# Patient Record
Sex: MALE | URBAN - METROPOLITAN AREA
[De-identification: names, ages, dates, MRNs, and addresses within clinical notes are randomized per-mention and may not be internally consistent; named-entity substitution may affect disease eponyms.]

---

## 2018-09-06 ENCOUNTER — OFFICE (OUTPATIENT)
Dept: URBAN - METROPOLITAN AREA PATHOLOGY 4 | Facility: PATHOLOGY | Age: 83
End: 2018-09-06

## 2018-09-06 ENCOUNTER — AMBULATORY SURGICAL CENTER (OUTPATIENT)
Dept: URBAN - METROPOLITAN AREA SURGERY 20 | Facility: SURGERY | Age: 83
End: 2018-09-06

## 2018-09-06 DIAGNOSIS — R85.89 OTHER ABNORMAL FINDINGS IN SPECIMENS FROM DIGESTIVE ORGANS A: ICD-10-CM

## 2018-09-06 DIAGNOSIS — K57.30 DIVERTICULOSIS OF LARGE INTESTINE WITHOUT PERFORATION OR ABS: ICD-10-CM

## 2018-09-06 DIAGNOSIS — K64.8 OTHER HEMORRHOIDS: ICD-10-CM

## 2018-09-06 DIAGNOSIS — D12.2 BENIGN NEOPLASM OF ASCENDING COLON: ICD-10-CM

## 2018-09-06 LAB
GI HISTOLOGY: A. SELECT: (no result)
GI HISTOLOGY: PDF REPORT: (no result)

## 2018-09-06 PROCEDURE — 88305 TISSUE EXAM BY PATHOLOGIST: CPT

## 2018-09-06 PROCEDURE — 45385 COLONOSCOPY W/LESION REMOVAL: CPT

## 2019-08-05 ENCOUNTER — OFFICE VISIT (OUTPATIENT)
Dept: INTERNAL MEDICINE | Facility: CLINIC | Age: 84
End: 2019-08-05

## 2019-08-05 VITALS
DIASTOLIC BLOOD PRESSURE: 76 MMHG | WEIGHT: 178.8 LBS | BODY MASS INDEX: 28.06 KG/M2 | HEIGHT: 67 IN | SYSTOLIC BLOOD PRESSURE: 134 MMHG

## 2019-08-05 DIAGNOSIS — E78.5 HYPERLIPIDEMIA, UNSPECIFIED HYPERLIPIDEMIA TYPE: ICD-10-CM

## 2019-08-05 DIAGNOSIS — Z00.00 MEDICARE ANNUAL WELLNESS VISIT, SUBSEQUENT: Primary | ICD-10-CM

## 2019-08-05 DIAGNOSIS — I25.10 ARTERIOSCLEROTIC HEART DISEASE: ICD-10-CM

## 2019-08-05 PROCEDURE — 90732 PPSV23 VACC 2 YRS+ SUBQ/IM: CPT | Performed by: INTERNAL MEDICINE

## 2019-08-05 PROCEDURE — G0439 PPPS, SUBSEQ VISIT: HCPCS | Performed by: INTERNAL MEDICINE

## 2019-08-05 PROCEDURE — G0009 ADMIN PNEUMOCOCCAL VACCINE: HCPCS | Performed by: INTERNAL MEDICINE

## 2019-08-05 RX ORDER — RAMIPRIL 2.5 MG/1
2.5 CAPSULE ORAL DAILY
Refills: 4 | COMMUNITY
Start: 2019-06-16 | End: 2020-04-06

## 2019-08-05 RX ORDER — ASPIRIN 81 MG/1
81 TABLET, CHEWABLE ORAL DAILY
COMMUNITY

## 2019-08-05 RX ORDER — ATORVASTATIN CALCIUM 40 MG/1
40 TABLET, FILM COATED ORAL DAILY
Refills: 4 | COMMUNITY
Start: 2019-06-16

## 2019-08-05 NOTE — PROGRESS NOTES
The ABCs of the Annual Wellness Visit  Subsequent Medicare Wellness Visit    Chief Complaint   Patient presents with   • Medicare Wellness-subsequent     yearly exam       Subjective   History of Present Illness:  Kendell Bolden is a 84 y.o. male who presents for a Subsequent Medicare Wellness Visit.  He denies chest pain, dyspnea or abdominal pain. His BP today was 112/71 and last week it was 109/78. He works 25 hours per week. He saw Dr. Scott in June. He will have an infrequent skipped heart beat; he has had them since age 42.  HEALTH RISK ASSESSMENT    Recent Hospitalizations:  No hospitalization(s) within the last year.    Current Medical Providers:  Patient Care Team:  Giovanni Rossi MD as PCP - General (Internal Medicine)    Smoking Status:  Social History     Tobacco Use   Smoking Status Never Smoker   Smokeless Tobacco Never Used       Alcohol Consumption:  Social History     Substance and Sexual Activity   Alcohol Use Yes   • Frequency: Monthly or less   • Drinks per session: 1 or 2       Depression Screen:   PHQ-2/PHQ-9 Depression Screening 8/5/2019   Little interest or pleasure in doing things 0   Feeling down, depressed, or hopeless 0   Trouble falling or staying asleep, or sleeping too much 0   Feeling tired or having little energy 0   Poor appetite or overeating 0   Feeling bad about yourself - or that you are a failure or have let yourself or your family down 0   Trouble concentrating on things, such as reading the newspaper or watching television 0   Moving or speaking so slowly that other people could have noticed. Or the opposite - being so fidgety or restless that you have been moving around a lot more than usual 0   Thoughts that you would be better off dead, or of hurting yourself in some way 0   Total Score 0   If you checked off any problems, how difficult have these problems made it for you to do your work, take care of things at home, or get along with other people? Not difficult at all        Fall Risk Screen:  STEADI Fall Risk Assessment was completed, and patient is at MODERATE risk for falls. Assessment completed on:8/5/2019    Health Habits and Functional and Cognitive Screening:  Functional & Cognitive Status 8/5/2019   Do you have difficulty preparing food and eating? No   Do you have difficulty bathing yourself, getting dressed or grooming yourself? No   Do you have difficulty using the toilet? No   Do you have difficulty moving around from place to place? No   Do you have trouble with steps or getting out of a bed or a chair? No   Current Diet Well Balanced Diet   Dental Exam Up to date   Eye Exam Up to date   Exercise (times per week) 5 times per week   Current Exercise Activities Include Yoga   Do you need help using the phone?  No   Are you deaf or do you have serious difficulty hearing?  No   Do you need help with transportation? No   Do you need help shopping? No   Do you need help preparing meals?  No   Do you need help with housework?  No   Do you need help with laundry? No   Do you need help taking your medications? No   Do you need help managing money? No   Do you ever drive or ride in a car without wearing a seat belt? No   Have you felt unusual stress, anger or loneliness in the last month? No   Who do you live with? Spouse   If you need help, do you have trouble finding someone available to you? No   Have you been bothered in the last four weeks by sexual problems? No   Do you have difficulty concentrating, remembering or making decisions? No         Does the patient have evidence of cognitive impairment? No    Asprin use counseling:Taking ASA appropriately as indicated    Age-appropriate Screening Schedule:  Refer to the list below for future screening recommendations based on patient's age, sex and/or medical conditions. Orders for these recommended tests are listed in the plan section. The patient has been provided with a written plan.    Health Maintenance   Topic Date Due  "  • ZOSTER VACCINE (1 of 2) 07/02/1987   • TDAP/TD VACCINES (1 - Tdap) 09/23/2008   • PNEUMOCOCCAL VACCINES (65+ LOW/MEDIUM RISK) (2 of 2 - PPSV23) 01/26/2016   • LIPID PANEL  08/05/2019   • INFLUENZA VACCINE  08/01/2019          The following portions of the patient's history were reviewed and updated as appropriate: allergies, current medications, past family history, past medical history, past social history, past surgical history and problem list.    Outpatient Medications Prior to Visit   Medication Sig Dispense Refill   • aspirin 81 MG chewable tablet Chew 81 mg Daily.     • atorvastatin (LIPITOR) 40 MG tablet Take 40 mg by mouth Daily.  4   • ramipril (ALTACE) 2.5 MG capsule Take 2.5 mg by mouth Daily.  4     No facility-administered medications prior to visit.        Patient Active Problem List   Diagnosis   • Medicare annual wellness visit, subsequent   • Hyperlipidemia   • Arteriosclerotic heart disease       Advanced Care Planning:  Patient has an advance directive - a copy has not been provided. Have asked the patient to send this to us to add to record    Review of Systems   Respiratory: Negative for shortness of breath.    Cardiovascular: Negative for chest pain.       Compared to one year ago, the patient feels his physical health is better.  Compared to one year ago, the patient feels his mental health is better.    Reviewed chart for potential of high risk medication in the elderly: not applicable  Reviewed chart for potential of harmful drug interactions in the elderly:not applicable    Objective         Vitals:    08/05/19 1559   BP: 134/76   Weight: 81.1 kg (178 lb 12.8 oz)   Height: 170.2 cm (67\")   PainSc: 0-No pain       Body mass index is 28 kg/m².  Discussed the patient's BMI with him. The BMI is in the acceptable range.    Physical Exam   Constitutional: He appears well-developed and well-nourished.   Neck: Carotid bruit is not present.   Cardiovascular: Normal rate, regular rhythm and " normal heart sounds.   Pulmonary/Chest: Effort normal and breath sounds normal.   Vitals reviewed.            Assessment/Plan   Medicare Risks and Personalized Health Plan  CMS Preventative Services Quick Reference  Immunizations Discussed/Encouraged (specific immunizations; Shingrix )    The above risks/problems have been discussed with the patient.  Pertinent information has been shared with the patient in the After Visit Summary.  Follow up plans and orders are seen below in the Assessment/Plan Section.    Diagnoses and all orders for this visit:    1. Medicare annual wellness visit, subsequent (Primary)    2. Hyperlipidemia, unspecified hyperlipidemia type  -     Comprehensive Metabolic Panel; Future  -     Lipid Panel With / Chol / HDL Ratio; Future    3. Arteriosclerotic heart disease    Other orders  -     Pneumococcal Polysaccharide Vaccine 23-Valent (PPSV23) Greater Than or Equal To 3yo Subcutaneous / IM      Follow Up:  No Follow-up on file.     An After Visit Summary and PPPS were given to the patient.     Pneumovax 23 today.    Discussed Shingrix and Tdap.     He exercise 4-5 times per week. Discussed PSA and he wants to stop checking. Check lipids in the next week. Get copy of csope from last year for the records.

## 2019-08-08 ENCOUNTER — RESULTS ENCOUNTER (OUTPATIENT)
Dept: INTERNAL MEDICINE | Facility: CLINIC | Age: 84
End: 2019-08-08

## 2019-08-08 DIAGNOSIS — E78.5 HYPERLIPIDEMIA, UNSPECIFIED HYPERLIPIDEMIA TYPE: ICD-10-CM

## 2019-08-12 LAB
ALBUMIN SERPL-MCNC: 4.1 G/DL (ref 3.5–5.2)
ALBUMIN/GLOB SERPL: 2.1 G/DL
ALP SERPL-CCNC: 61 U/L (ref 39–117)
ALT SERPL-CCNC: 14 U/L (ref 1–41)
AST SERPL-CCNC: 18 U/L (ref 1–40)
BILIRUB SERPL-MCNC: 0.7 MG/DL (ref 0.2–1.2)
BUN SERPL-MCNC: 24 MG/DL (ref 8–23)
BUN/CREAT SERPL: 21.8 (ref 7–25)
CALCIUM SERPL-MCNC: 9.4 MG/DL (ref 8.6–10.5)
CHLORIDE SERPL-SCNC: 104 MMOL/L (ref 98–107)
CHOLEST SERPL-MCNC: 119 MG/DL (ref 0–200)
CHOLEST/HDLC SERPL: 2.64 {RATIO}
CO2 SERPL-SCNC: 28.5 MMOL/L (ref 22–29)
CREAT SERPL-MCNC: 1.1 MG/DL (ref 0.76–1.27)
GLOBULIN SER CALC-MCNC: 2 GM/DL
GLUCOSE SERPL-MCNC: 104 MG/DL (ref 65–99)
HDLC SERPL-MCNC: 45 MG/DL (ref 40–60)
LDLC SERPL CALC-MCNC: 53 MG/DL (ref 0–100)
POTASSIUM SERPL-SCNC: 5.3 MMOL/L (ref 3.5–5.2)
PROT SERPL-MCNC: 6.1 G/DL (ref 6–8.5)
SODIUM SERPL-SCNC: 142 MMOL/L (ref 136–145)
TRIGL SERPL-MCNC: 105 MG/DL (ref 0–150)
VLDLC SERPL CALC-MCNC: 21 MG/DL

## 2019-11-22 ENCOUNTER — TRANSCRIBE ORDERS (OUTPATIENT)
Dept: ADMINISTRATIVE | Facility: HOSPITAL | Age: 84
End: 2019-11-22

## 2019-11-22 DIAGNOSIS — G56.01 CARPAL TUNNEL SYNDROME OF RIGHT WRIST: Primary | ICD-10-CM

## 2019-12-26 ENCOUNTER — HOSPITAL ENCOUNTER (OUTPATIENT)
Dept: INFUSION THERAPY | Facility: HOSPITAL | Age: 84
Discharge: HOME | End: 2019-12-26

## 2019-12-26 DIAGNOSIS — G56.01 CARPAL TUNNEL SYNDROME OF RIGHT WRIST: ICD-10-CM

## 2020-01-23 ENCOUNTER — HOSPITAL ENCOUNTER (OUTPATIENT)
Dept: INFUSION THERAPY | Facility: HOSPITAL | Age: 85
Discharge: HOME | End: 2020-01-23
Admitting: PSYCHIATRY & NEUROLOGY

## 2020-01-23 DIAGNOSIS — Z00.00 MEDICARE ANNUAL WELLNESS VISIT, SUBSEQUENT: ICD-10-CM

## 2020-01-23 PROCEDURE — 95910 NRV CNDJ TEST 7-8 STUDIES: CPT | Performed by: PSYCHIATRY & NEUROLOGY

## 2020-01-23 PROCEDURE — 95886 MUSC TEST DONE W/N TEST COMP: CPT | Performed by: PSYCHIATRY & NEUROLOGY

## 2020-01-23 PROCEDURE — 95886 MUSC TEST DONE W/N TEST COMP: CPT

## 2020-01-23 PROCEDURE — 95910 NRV CNDJ TEST 7-8 STUDIES: CPT

## 2020-01-23 NOTE — PROCEDURES
EMG and Nerve Conduction Studies    I.      Instrument used: Neuromax 1002  II.     Please see data sheets for tabular summary of NCS and details on methods, temperatures and lab standards.   III.    EMG muscles tested for upper extremity studies include the deltoid, biceps, triceps, pronator teres, extensor digitorum communis, first dorsal interosseous and abductor pollicis brevis.    IV.   EMG muscles tested for lower extremity studies include the vastus lateralis, tibialis anterior, peroneus longus, medial gastrocnemius and extensor digitorum brevis.    V.    Additional muscles tested as needed.  Paraspinal muscles tested as needed.   VI.   Please see data sheets for tabular summary of EMG findings.   VII. The complete report includes the data sheets.      Indication: Right carpal tunnel syndrome  History: 84-year-old male with 2-month history of numbness and tingling in the first 3-1/2 digits of the right hand.      Ht: 170.2 cm  Wt: Not reported  HbA1C: No results found for: HGBA1C  TSH: No results found for: TSH    Technical summary:  Nerve conduction studies were obtained in the right arm with comparisons on the left as indicated.  The hands were rather cold initially and so they were warmed prior to study.  Still temperatures dropped below 32 °C at times and temperature correction was used at those times.  Needle examination was obtained on selected muscles of the right arm.    Results:  1.  Absent right median sensory potential.  Normal left median sensory study.  2.  Prolonged right ulnar sensory latency at 3.8 ms with low amplitude of 10.7 µV.  Normal left ulnar sensory latency with low amplitude of 11.7 µV.  3.  Severely prolonged right median motor latency with temperature correction at 7.0 ms with normal conduction velocity.  The amplitude was very low at 1.1 mV from wrist stimulation.  The amplitude was somewhat higher from  palm stimulation but not greater than 50% higher.  4.  Slow right ulnar motor  conduction velocity in the short segment across the elbow at 47.6 m/s with normal velocity below the elbow.  Normal distal latency and amplitudes.  Slow left ulnar motor conduction velocity in the short segment across the elbow at 46.2 m/s with normal velocity below the elbow.  Normal distal latency and amplitudes.  5.  Needle examination of selected muscles in the right arm showed fibrillations and positive sharp waves in the abductor pollicis brevis.  There was a moderate increased number of large motor units with rapid firing rates and reduced interference patterns.  Remaining muscles tested showed normal insertional activities, motor units and recruitment patterns other than an occasional large motor unit in the first dorsal interosseous.  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  This combination is suspicious for more diffuse peripheral neuropathy.  No additional evidence of a right cervical radiculopathy by this study.  The needle exam changes were consistent with the nerve conduction findings.  Study results were discussed with the patient.    Dmitry Knight M.D.              Dictated utilizing Dragon dictation.

## 2020-04-06 ENCOUNTER — TELEMEDICINE (OUTPATIENT)
Dept: INTERNAL MEDICINE | Facility: CLINIC | Age: 85
End: 2020-04-06

## 2020-04-06 DIAGNOSIS — R50.9 FEVER, UNSPECIFIED FEVER CAUSE: Primary | ICD-10-CM

## 2020-04-06 PROCEDURE — 99213 OFFICE O/P EST LOW 20 MIN: CPT | Performed by: INTERNAL MEDICINE

## 2020-04-06 RX ORDER — OSELTAMIVIR PHOSPHATE 75 MG/1
75 CAPSULE ORAL 2 TIMES DAILY
Qty: 10 CAPSULE | Refills: 0 | Status: SHIPPED | OUTPATIENT
Start: 2020-04-06 | End: 2020-08-10

## 2020-04-06 RX ORDER — RAMIPRIL 5 MG/1
5 CAPSULE ORAL DAILY
COMMUNITY

## 2020-04-06 NOTE — PROGRESS NOTES
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 He is sweating profusely. He has a dry cough that is not new. He denies dyspnea. He stopped seeing patients 2.5 weeks ago. He has been to the bank twice in the last week. He wore gloves but no mask. He has no sick contacts. He denies a sore throat.  No Known Allergies    Current Outpatient Medications on File Prior to Visit   Medication Sig Dispense Refill   • aspirin 81 MG chewable tablet Chew 81 mg Daily.     • atorvastatin (LIPITOR) 40 MG tablet Take 40 mg by mouth Daily.  4   • ramipril (ALTACE) 5 MG capsule Take 5 mg by mouth Daily.     • [DISCONTINUED] ramipril (ALTACE) 2.5 MG capsule Take 2.5 mg by mouth Daily.  4     No current facility-administered medications on file prior to visit.        Past Medical History:   Diagnosis Date   • Arteriosclerotic heart disease    • Bifascicular block    • Hyperlipidemia    • PVC (premature ventricular contraction)    • RBBB    • Silent ischemia        Past Surgical History:   Procedure Laterality Date   • COLONOSCOPY  2018   • CORONARY ARTERY BYPASS GRAFT  1999   • FOOT SURGERY         Family History   Problem Relation Age of Onset   • Hypertension Mother    • Aortic stenosis Father    • Hypertension Sister        Social History     Socioeconomic History   • Marital status:      Spouse name: Not on file   • Number of children: Not on file   • Years of education: Not on file   • Highest education level: Not on file   Tobacco Use   • Smoking status: Never Smoker   • Smokeless tobacco: Never Used   Substance and Sexual Activity   • Alcohol use: Yes     Frequency: Monthly or less     Drinks per session: 1 or 2   •  Drug use: No   • Sexual activity: Defer           The following portions of the patient's history were reviewed and updated as appropriate: problem list, allergies, current medications, past medical history, past family history, past social history and past surgical history.    Review of Systems   Constitutional: Positive for fever.   Respiratory: Positive for cough.        Immunization History   Administered Date(s) Administered   • Fluzone High Dose =>65 Years ([employer] ONLY) 09/08/2018   • Hepatitis A 04/30/2018, 11/02/2018   • Pneumococcal Conjugate 13-Valent (PCV13) 01/26/2015   • Pneumococcal Polysaccharide (PPSV23) 08/05/2019   • Td 09/22/2008   • Zoster, Unspecified 10/14/2007       Objective   There were no vitals filed for this visit.  There is no height or weight on file to calculate BMI.  Physical Exam   Constitutional: He appears well-developed and well-nourished.   Neurological: He is alert.   Psychiatric: He has a normal mood and affect.       Procedures    Assessment/Plan   Kendell was seen today for fever.    Diagnoses and all orders for this visit:    Fever, unspecified fever cause  -     oseltamivir (TAMIFLU) 75 MG capsule; Take 1 capsule by mouth 2 (Two) Times a Day.             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 I am more concerned about COVID 19 but sending him to the hospital at this point would not change his management. I explained to his wife and daughter that Plaquenil and Zmax are not indicated. I do think tamiflu is reasonable option given temp and less than 48 hours since symptoms started and it is tail end of flu season. Discussed it would not help COVID. Recc to ER for dyspnea, weakness, or confusion.  Discussed tylenol, self quarantine, and fluids.    8:11 pm on April 6th. I left message on his home phone checking in and no one answered. I called his daughter who said that he did not have a fever with tylenol. Reviewed again symptoms to go to ER for.   No follow-ups on  file.

## 2020-04-07 ENCOUNTER — TELEPHONE (OUTPATIENT)
Dept: INTERNAL MEDICINE | Facility: CLINIC | Age: 85
End: 2020-04-07

## 2020-04-07 NOTE — TELEPHONE ENCOUNTER
Called patient, he states he is terrific.  As far as he can tell he is back to normal. Thanked us for calling to check on him!

## 2020-08-10 ENCOUNTER — OFFICE VISIT (OUTPATIENT)
Dept: INTERNAL MEDICINE | Facility: CLINIC | Age: 85
End: 2020-08-10

## 2020-08-10 VITALS
HEIGHT: 70 IN | SYSTOLIC BLOOD PRESSURE: 136 MMHG | DIASTOLIC BLOOD PRESSURE: 76 MMHG | WEIGHT: 175.2 LBS | TEMPERATURE: 98.3 F | BODY MASS INDEX: 25.08 KG/M2

## 2020-08-10 DIAGNOSIS — E78.49 OTHER HYPERLIPIDEMIA: ICD-10-CM

## 2020-08-10 DIAGNOSIS — Z00.00 MEDICARE ANNUAL WELLNESS VISIT, SUBSEQUENT: Primary | ICD-10-CM

## 2020-08-10 PROCEDURE — G0439 PPPS, SUBSEQ VISIT: HCPCS | Performed by: INTERNAL MEDICINE

## 2020-08-10 NOTE — PROGRESS NOTES
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  He denies chest pain or dyspnea. His BP has been 114-118/68. He is exercising 6 days per week. He saw Dr. Scott last month and he told him that he was doing well.  HEALTH RISK ASSESSMENT    Recent Hospitalizations:  No hospitalization(s) within the last year.    Current Medical Providers:  Patient Care Team:  Giovanni Rossi MD as PCP - General (Internal Medicine)  Sekou Youngblood MD as PCP - Claims Attributed    Smoking Status:  Social History     Tobacco Use   Smoking Status Never Smoker   Smokeless Tobacco Never Used       Alcohol Consumption:  Social History     Substance and Sexual Activity   Alcohol Use Yes   • Frequency: Monthly or less   • Drinks per session: 1 or 2       Depression Screen:   PHQ-2/PHQ-9 Depression Screening 8/10/2020   Little interest or pleasure in doing things 0   Feeling down, depressed, or hopeless 0   Trouble falling or staying asleep, or sleeping too much -   Feeling tired or having little energy -   Poor appetite or overeating -   Feeling bad about yourself - or that you are a failure or have let yourself or your family down -   Trouble concentrating on things, such as reading the newspaper or watching television -   Moving or speaking so slowly that other people could have noticed. Or the opposite - being so fidgety or restless that you have been moving around a lot more than usual -   Thoughts that you would be better off dead, or of hurting yourself in some way -   Total Score 0   If you checked off any problems, how difficult have these problems made it for you to do your work, take care of things at home, or get along with other people? -       Fall Risk Screen:  STEADI Fall Risk Assessment  was completed, and patient is at LOW risk for falls.Assessment completed on:8/10/2020    Health Habits and Functional and Cognitive Screening:  Functional & Cognitive Status 8/10/2020   Do you have difficulty preparing food and eating? No   Do you have difficulty bathing yourself, getting dressed or grooming yourself? No   Do you have difficulty using the toilet? No   Do you have difficulty moving around from place to place? No   Do you have trouble with steps or getting out of a bed or a chair? No   Current Diet Well Balanced Diet   Dental Exam Up to date   Eye Exam Up to date   Exercise (times per week) 6 times per week   Current Exercise Activities Include Cardiovasular Workout on Exercise Equipment   Do you need help using the phone?  No   Are you deaf or do you have serious difficulty hearing?  No   Do you need help with transportation? No   Do you need help shopping? No   Do you need help preparing meals?  No   Do you need help with housework?  No   Do you need help with laundry? No   Do you need help taking your medications? No   Do you need help managing money? No   Do you ever drive or ride in a car without wearing a seat belt? No   Have you felt unusual stress, anger or loneliness in the last month? No   Who do you live with? Spouse   If you need help, do you have trouble finding someone available to you? No   Have you been bothered in the last four weeks by sexual problems? No   Do you have difficulty concentrating, remembering or making decisions? No         Does the patient have evidence of cognitive impairment? No    Asprin use counseling:Taking ASA appropriately as indicated    Age-appropriate Screening Schedule:  Refer to the list below for future screening recommendations based on patient's age, sex and/or medical conditions. Orders for these recommended tests are listed in the plan section. The patient has been provided with a written plan.    Health Maintenance   Topic Date Due   • TDAP/TD  "VACCINES (1 - Tdap) 07/02/1948   • ZOSTER VACCINE (1 of 2) 07/02/1987   • INFLUENZA VACCINE  08/01/2020   • LIPID PANEL  08/12/2020          The following portions of the patient's history were reviewed and updated as appropriate: allergies, current medications, past family history, past medical history, past social history, past surgical history and problem list.    Outpatient Medications Prior to Visit   Medication Sig Dispense Refill   • aspirin 81 MG chewable tablet Chew 81 mg Daily.     • atorvastatin (LIPITOR) 40 MG tablet Take 40 mg by mouth Daily.  4   • ramipril (ALTACE) 5 MG capsule Take 5 mg by mouth Daily.     • oseltamivir (TAMIFLU) 75 MG capsule Take 1 capsule by mouth 2 (Two) Times a Day. 10 capsule 0     No facility-administered medications prior to visit.        Patient Active Problem List   Diagnosis   • Medicare annual wellness visit, subsequent   • Hyperlipidemia   • Arteriosclerotic heart disease       Advanced Care Planning:  ACP discussion was held with the patient during this visit. Patient has an advance directive (not in EMR), copy requested.    Review of Systems   Respiratory: Negative for shortness of breath.    Cardiovascular: Negative for chest pain.       Compared to one year ago, the patient feels his physical health is the same.  Compared to one year ago, the patient feels his mental health is the same.    Reviewed chart for potential of high risk medication in the elderly: yes  Reviewed chart for potential of harmful drug interactions in the elderly:yes    Objective         Vitals:    08/10/20 1634   BP: 136/76   Temp: 98.3 °F (36.8 °C)   Weight: 79.5 kg (175 lb 3.2 oz)   Height: 177.8 cm (70\")       Body mass index is 25.14 kg/m².  Discussed the patient's BMI with him. The BMI is in the acceptable range.    Physical Exam   Constitutional: He appears well-developed and well-nourished.   Neck: Carotid bruit is not present.   Cardiovascular: Normal rate, regular rhythm and normal " heart sounds.   Pulmonary/Chest: Effort normal and breath sounds normal.   Nursing note and vitals reviewed.            Assessment/Plan   Medicare Risks and Personalized Health Plan  CMS Preventative Services Quick Reference  Advance Directive Discussion  Immunizations Discussed/Encouraged (specific immunizations; adacel Tdap )    The above risks/problems have been discussed with the patient.  Pertinent information has been shared with the patient in the After Visit Summary.  Follow up plans and orders are seen below in the Assessment/Plan Section.    Diagnoses and all orders for this visit:    1. Medicare annual wellness visit, subsequent (Primary)    2. Other hyperlipidemia  -     Comprehensive Metabolic Panel  -     Lipid Panel With / Chol / HDL Ratio      Follow Up:  No follow-ups on file.     An After Visit Summary and PPPS were given to the patient.         He developed a rash after his first Shingrix. Labs in the next week. He is doing well.

## 2020-08-14 ENCOUNTER — TELEPHONE (OUTPATIENT)
Dept: INTERNAL MEDICINE | Facility: CLINIC | Age: 85
End: 2020-08-14

## 2020-08-14 NOTE — TELEPHONE ENCOUNTER
Patient called and needs to reschedule lab appt for 9/24. He is available any Monday and needs 8am or is available 8/27, 9/10, 9/3, 9/17, 10/1,10/15 and 10/29.    Best call back [phone]    Attempted warm transfer unsuccessful.

## 2021-03-09 DIAGNOSIS — Z23 IMMUNIZATION DUE: ICD-10-CM

## 2021-04-02 ENCOUNTER — OFFICE VISIT (OUTPATIENT)
Dept: INTERNAL MEDICINE | Facility: CLINIC | Age: 86
End: 2021-04-02

## 2021-04-02 VITALS — BODY MASS INDEX: 25.62 KG/M2 | WEIGHT: 179 LBS | TEMPERATURE: 97.8 F | HEIGHT: 70 IN

## 2021-04-02 DIAGNOSIS — M79.604 RIGHT LEG PAIN: Primary | ICD-10-CM

## 2021-04-02 DIAGNOSIS — E78.49 OTHER HYPERLIPIDEMIA: ICD-10-CM

## 2021-04-02 PROCEDURE — 99213 OFFICE O/P EST LOW 20 MIN: CPT | Performed by: INTERNAL MEDICINE

## 2021-04-02 NOTE — PROGRESS NOTES
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 He will get a tingling sensation with movement of his right ankle. This started 2-3 weeks ago. The right ankle can hurt sometimes. He does not have back pain. He feels the pain down his right lateral thigh.  No Known Allergies    Current Outpatient Medications on File Prior to Visit   Medication Sig Dispense Refill   • aspirin 81 MG chewable tablet Chew 81 mg Daily.     • atorvastatin (LIPITOR) 40 MG tablet Take 40 mg by mouth Daily.  4   • Multiple Vitamins-Minerals (VITEYES AREDS ADVANCED PO) Take  by mouth 2 (two) times a day.     • ramipril (ALTACE) 5 MG capsule Take 5 mg by mouth Daily.       No current facility-administered medications on file prior to visit.       Past Medical History:   Diagnosis Date   • Arteriosclerotic heart disease    • Bifascicular block    • Hyperlipidemia    • PVC (premature ventricular contraction)    • RBBB    • Silent ischemia        Past Surgical History:   Procedure Laterality Date   • CARPAL TUNNEL RELEASE Right 2020   • COLONOSCOPY  09/06/2018   • CORONARY ARTERY BYPASS GRAFT  1999   • FOOT SURGERY     • HAND SURGERY Right        Family History   Problem Relation Age of Onset   • Hypertension Mother    • Aortic stenosis Father    • Hypertension Sister        Social History     Socioeconomic History   • Marital status:      Spouse name: Not on file   • Number of children: Not on file   • Years of education: Not on file   • Highest education level: Not on file   Tobacco Use   • Smoking status: Never Smoker   • Smokeless tobacco: Never Used   Substance and Sexual Activity   • Alcohol use: Yes   • Drug use: No   • Sexual activity: Defer           The following  "portions of the patient's history were reviewed and updated as appropriate: problem list, allergies, current medications, past medical history, past family history, past social history and past surgical history.    Review of Systems    Immunization History   Administered Date(s) Administered   • COVID-19 (PFIZER) 01/04/2021, 01/25/2021   • Flu Vaccine Intradermal Quad 18-64YR 09/16/2018   • Fluzone High Dose =>65 Years (Vaxcare ONLY) 09/08/2018, 09/29/2019   • Hepatitis A 04/30/2018, 11/02/2018   • Pneumococcal Conjugate 13-Valent (PCV13) 01/26/2015   • Pneumococcal Polysaccharide (PPSV23) 08/05/2019   • Shingrix 09/01/2019   • Td 09/22/2008   • Zoster, Unspecified 10/14/2007       Objective   Vitals:    04/02/21 1549   Temp: 97.8 °F (36.6 °C)   Weight: 81.2 kg (179 lb)   Height: 177.8 cm (70\")     Body mass index is 25.68 kg/m².  Physical Exam  Neurological:      Mental Status: He is alert.      Deep Tendon Reflexes:      Reflex Scores:       Patellar reflexes are 2+ on the right side and 2+ on the left side.       Achilles reflexes are 0 on the right side and 0 on the left side.     Comments: 5/5 strength in his legs    Negative SLR   Psychiatric:         Mood and Affect: Mood normal.         Procedures    Assessment/Plan   Diagnoses and all orders for this visit:    1. Right leg pain (Primary)  -     Ambulatory Referral to Physical Therapy Evaluate and treat    2. Other hyperlipidemia  -     Comprehensive Metabolic Panel; Future  -     Lipid Panel With / Chol / HDL Ratio; Future             Trial of PT for likely pinched nerve. Set up labs for his next OV. Recc Tdap at drug [employer] [facility].    No follow-ups on file.  Answers for HPI/ROS submitted by the patient on 4/2/2021  What is the primary reason for your visit?: Lower Extremity Injury  Incident occurred: more than 1 week ago  Incident location: at home  Injury mechanism: unknown  Pain location: right thigh, right ankle  Pain quality: aching  Pain - numeric: 3/10  Pain " course: intermittent  tingling: Yes

## 2021-04-20 ENCOUNTER — TREATMENT (OUTPATIENT)
Dept: PHYSICAL THERAPY | Facility: CLINIC | Age: 86
End: 2021-04-20

## 2021-04-20 DIAGNOSIS — M79.604 LEG PAIN, LATERAL, RIGHT: Primary | ICD-10-CM

## 2021-04-20 PROCEDURE — 97110 THERAPEUTIC EXERCISES: CPT | Performed by: PHYSICAL THERAPIST

## 2021-04-20 PROCEDURE — 97530 THERAPEUTIC ACTIVITIES: CPT | Performed by: PHYSICAL THERAPIST

## 2021-04-20 PROCEDURE — 97161 PT EVAL LOW COMPLEX 20 MIN: CPT | Performed by: PHYSICAL THERAPIST

## 2021-04-20 NOTE — PROGRESS NOTES
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   : 1935  Diagnosis/ICD-10 Code:  Leg pain, lateral, right [M79.604]  Referring practitioner: Giovanni Rossi MD  Date of Initial Visit: 2021  Today's Date: 2021  Patient seen for 1 sessions           Subjective Evaluation    History of Present Illness  Mechanism of injury: 6-10 months onset of R ankle pain after he has had swelling in B legs.  The pain has been going away after exercise. About 6 weeks ago he was playing golf with new  (remote controlled) After using he felt soreness on outer R leg. The leg pain is just about gone but the ankle still hurts.  The ankle is sore in the morning and after after getting out from driving longer distance (1 hour to [place]) Next month he has longer travel plans. .       Patient Occupation: Oral surgeon Pain  Current pain ratin  At best pain ratin  At worst pain ratin  Quality: tight and dull ache             Objective          Observations     Right Ankle/Foot   Positive for edema.       Active Range of Motion     Lumbar   Flexion: Active lumbar flexion: 75% has to bend knees for tight hamstrings    Extension: Active lumbar extension: 2/3.   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     Right   Negative passive SLR.     Lumbar Flexibility Comments:   Moderate tightness of calf muscles, hamstrings, piriformis         See Exercise, Manual, and Modality Logs for complete treatment.   Patient issued HEP for supine 90/90 HS stretch with ankle ROM that can function as a neural glide exercise and supine piriformis stretch (pigeon on his back)  Suggest he return to yoga but be aware of right leg pain changes as yoga does work into far motion of the spine.  Discussed that symptoms may be referred from the lumbar spine due to degenerative changes and may be caused by lifting, twisting, prolonged sitting.   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 year-old male referred to physical therapy for right lateral leg and ankle pain. He presents with a stable clinical presentation.  He has comorbidities of limited ankle ROM due to past club foot surgery and no known personal factors  that may affect his progress in the plan of care.  His signs and symptoms most closely resemble a L5 lumbar radiculopathy and he does report more pain at the end of session.  His pain was not correlated with any testing today.  Swelling in the right ankle pain be neural or vascular compromise.  He does not want to pursue diagnostic testing at this time.  He is motivated to return to yoga and continue good back habits and exercise.  There is no easy solution for his 1 hour drives to work. He will follow up with physical therapy if  needed.  Prognosis: good  Functional Limitations: uncomfortable because of pain and sitting  Goals  Plan Goals: STGs to be met today:    STG1 Patient will demonstrate an independent HEP and education  Met    STG2 Patient will be independent with good back postures and body mechanics to avoid strain to spine with ADLs  Met    LTGs to be achieved by 21:    LTG 1 Patient will manage his condition independently or return to therapy        Plan  Planned therapy interventions: abdominal trunk stabilization, neuromuscular re-education, manual therapy, home exercise program and therapeutic activities  Duration in visits: 20  Duration in weeks: 12  Treatment plan discussed with: patient        Timed:  Manual Therapy:    0     mins  53648;  Therapeutic Exercise:    15     mins  62962;     Neuromuscular Ihsan:    0    mins  13722;    Therapeutic Activity:     10     mins  54493;     Gait Trainin     mins  48833;     Ultrasound:     0     mins  27166;    Electrical Stimulation:    0     mins  37551 ( );  Iontophoresis    0     mins 91379  Dry Needling   0     mins      Untimed:  Electrical Stimulation:    0     mins  36566 ( );  Mechanical Traction:    0     mins  93154;     Timed Treatment:   25   mins   Total Treatment:     50   mins    PT SIGNATURE: Anu Anderson, PT   DATE TREATMENT INITIATED: 2021    Medicare Initial Certification  Certification Period: 2021  I certify that the therapy services are furnished while this patient is under my care.  The services outlined above are required by this patient, and will be reviewed every 90 days.     PHYSICIAN: Giovanni Rossi MD      DATE:     Please sign and return via fax to [phone] Thank you, [facility].

## 2021-09-09 ENCOUNTER — DOCUMENTATION (OUTPATIENT)
Dept: PHYSICAL THERAPY | Facility: CLINIC | Age: 86
End: 2021-09-09

## 2021-09-09 NOTE — PROGRESS NOTES
Closure of Physical Therapy Encounter  Patient did not return after evaluation             Anu Anderson, PT  Physical Therapist

## 2022-02-08 ENCOUNTER — OFFICE VISIT (OUTPATIENT)
Dept: INTERNAL MEDICINE | Facility: CLINIC | Age: 87
End: 2022-02-08

## 2022-02-08 VITALS
BODY MASS INDEX: 25.77 KG/M2 | TEMPERATURE: 97.8 F | HEIGHT: 70 IN | DIASTOLIC BLOOD PRESSURE: 76 MMHG | WEIGHT: 180 LBS | SYSTOLIC BLOOD PRESSURE: 132 MMHG

## 2022-02-08 DIAGNOSIS — Z00.00 MEDICARE ANNUAL WELLNESS VISIT, SUBSEQUENT: Primary | ICD-10-CM

## 2022-02-08 DIAGNOSIS — I25.10 ARTERIOSCLEROTIC HEART DISEASE: ICD-10-CM

## 2022-02-08 DIAGNOSIS — E78.49 OTHER HYPERLIPIDEMIA: ICD-10-CM

## 2022-02-08 PROCEDURE — G0439 PPPS, SUBSEQ VISIT: HCPCS | Performed by: INTERNAL MEDICINE

## 2022-02-08 PROCEDURE — 1159F MED LIST DOCD IN RCRD: CPT | Performed by: INTERNAL MEDICINE

## 2022-02-08 NOTE — PROGRESS NOTES
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     • atorvastatin (LIPITOR) 40 MG tablet Take 40 mg by mouth Daily.  4   • Carboxymethylcellulose Sodium (EYE DROPS OP) Apply  to eye(s) as directed by provider.     • Multiple Vitamins-Minerals (VITEYES AREDS ADVANCED PO) Take  by mouth 2 (two) times a day.     • ramipril (ALTACE) 5 MG capsule Take 5 mg by mouth Daily.       No current facility-administered medications on file prior to visit.       Past Medical History:   Diagnosis Date   • Bifascicular block    • PVC (premature ventricular contraction)    • RBBB        Past Surgical History:   Procedure Laterality Date   • CARPAL TUNNEL RELEASE Right 2020   • COLONOSCOPY  09/06/2018   • CORONARY ARTERY BYPASS GRAFT  1999   • FOOT SURGERY     • HAND SURGERY Right        Family History   Problem Relation Age of Onset   • Hypertension Mother    • Aortic stenosis Father    • Hypertension Sister        Social History     Socioeconomic History   • Marital status:    Tobacco Use   • Smoking status: Never Smoker   • Smokeless tobacco: Never Used   Substance and Sexual Activity   • Alcohol use: Yes   • Drug use: No   • Sexual activity: Defer           The following portions of the patient's history were reviewed and updated as appropriate: {history reviewed:72842::\"problem list\",\"allergies\",\"current medications\",\"past medical history\",\"past family history\",\"past social history\",\"past surgical history\"}.    Review of Systems    Immunization History   Administered Date(s) Administered   • COVID-19 (PFIZER) PURPLE " "CAP 01/04/2021, 01/25/2021   • Flu Vaccine Intradermal Quad 18-64YR 09/16/2018   • Fluzone High Dose =>65 Years (Vaxcare ONLY) 09/08/2018, 09/29/2019   • Hepatitis A 04/30/2018, 11/02/2018   • Pneumococcal Conjugate 13-Valent (PCV13) 01/26/2015   • Pneumococcal Polysaccharide (PPSV23) 08/05/2019   • Shingrix 09/01/2019   • Td 09/22/2008   • Zoster, Unspecified 10/14/2007       Objective   Vitals:    02/08/22 1625   Temp: 97.8 °F (36.6 °C)   Weight: 81.6 kg (180 lb)   Height: 177.8 cm (70\")     Body mass index is 25.83 kg/m².  Physical Exam    Procedures    Assessment/Plan   {Assess/PlanSmartLinks:36726}             No follow-ups on file.  "

## 2022-02-08 NOTE — PROGRESS NOTES
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 male who presents for a Subsequent Medicare Wellness Visit.  He denies chest pain or dyspnea. His BP at home has been 106-110/65-68.  The following portions of the patient's history were reviewed and   updated as appropriate: allergies, current medications, past family history, past medical history, past social history, past surgical history and problem list.    Compared to one year ago, the patient feels his physical   health is the same.    Compared to one year ago, the patient feels his mental   health is the same.    Recent Hospitalizations:  He was not admitted to the hospital during the last year.       Current Medical Providers:  Patient Care Team:  Giovanni Rossi MD as PCP - General (Internal Medicine)    Outpatient Medications Prior to Visit   Medication Sig Dispense Refill   • aspirin 81 MG chewable tablet Chew 81 mg Daily.     • atorvastatin (LIPITOR) 40 MG tablet Take 40 mg by mouth Daily.  4   • Carboxymethylcellulose Sodium (EYE DROPS OP) Apply  to eye(s) as directed by provider.     • Multiple Vitamins-Minerals (VITEYES AREDS ADVANCED PO) Take  by mouth 2 (two) times a day.     • ramipril (ALTACE) 5 MG capsule Take 5 mg by mouth Daily.       No facility-administered medications prior to visit.       No opioid medication identified on active medication list. I have reviewed chart for other potential  high risk medication/s and harmful drug interactions in the elderly.          Aspirin is on active medication list. Aspirin use is indicated based on review of current medical condition/s. Pros and cons of this therapy have been discussed today. Benefits of this medication outweigh potential harm.  Patient has been encouraged to continue taking this medication.   ".      Patient Active Problem List   Diagnosis   • Other hyperlipidemia   • Arteriosclerotic heart disease     Advance Care Planning  Advance Directive is on file.  ACP discussion was held with the patient during this visit. Patient has an advance directive in EMR which is still valid.           Objective    Vitals:    02/08/22 1625   BP: 132/76   Temp: 97.8 °F (36.6 °C)   Weight: 81.6 kg (180 lb)   Height: 177.8 cm (70\")     BMI Readings from Last 1 Encounters:   02/08/22 25.83 kg/m²   BMI is above normal parameters. Recommendations include: exercise counseling    Does the patient have evidence of cognitive impairment? No    Physical Exam  Vitals reviewed.   Constitutional:       Appearance: He is well-developed.   HENT:      Head: Normocephalic and atraumatic.   Neck:      Vascular: No carotid bruit.   Cardiovascular:      Rate and Rhythm: Normal rate and regular rhythm.      Heart sounds: Normal heart sounds, S1 normal and S2 normal.   Pulmonary:      Effort: Pulmonary effort is normal.      Breath sounds: Normal breath sounds.   Skin:     General: Skin is warm.   Neurological:      Mental Status: He is alert.   Psychiatric:         Behavior: Behavior normal.                 HEALTH RISK ASSESSMENT    Smoking Status:  Social History     Tobacco Use   Smoking Status Never Smoker   Smokeless Tobacco Never Used     Alcohol Consumption:  Social History     Substance and Sexual Activity   Alcohol Use Yes     Fall Risk Screen:    STEADI Fall Risk Assessment was completed, and patient is at LOW risk for falls.Assessment completed on:2/8/2022    Depression Screening:  PHQ-2/PHQ-9 Depression Screening 2/8/2022   Little interest or pleasure in doing things 0   Feeling down, depressed, or hopeless 0   Trouble falling or staying asleep, or sleeping too much -   Feeling tired or having little energy -   Poor appetite or overeating -   Feeling bad about yourself - or that you are a failure or have let yourself or your family " down -   Trouble concentrating on things, such as reading the newspaper or watching television -   Moving or speaking so slowly that other people could have noticed. Or the opposite - being so fidgety or restless that you have been moving around a lot more than usual -   Thoughts that you would be better off dead, or of hurting yourself in some way -   Total Score 0   If you checked off any problems, how difficult have these problems made it for you to do your work, take care of things at home, or get along with other people? -       Health Habits and Functional and Cognitive Screening:  Functional & Cognitive Status 2/8/2022   Do you have difficulty preparing food and eating? No   Do you have difficulty bathing yourself, getting dressed or grooming yourself? No   Do you have difficulty using the toilet? No   Do you have difficulty moving around from place to place? No   Do you have trouble with steps or getting out of a bed or a chair? No   Current Diet Well Balanced Diet   Dental Exam Up to date   Eye Exam Up to date   Exercise (times per week) 3 times per week   Current Exercises Include Stair Step Machine;Cardiovascular Workout   Current Exercise Activities Include -   Do you need help using the phone?  No   Are you deaf or do you have serious difficulty hearing?  No   Do you need help with transportation? No   Do you need help shopping? No   Do you need help preparing meals?  No   Do you need help with housework?  No   Do you need help with laundry? No   Do you need help taking your medications? No   Do you need help managing money? No   Do you ever drive or ride in a car without wearing a seat belt? No   Have you felt unusual stress, anger or loneliness in the last month? No   Who do you live with? Spouse   If you need help, do you have trouble finding someone available to you? No   Have you been bothered in the last four weeks by sexual problems? No   Do you have difficulty concentrating, remembering or  making decisions? No       Age-appropriate Screening Schedule:  Refer to the list below for future screening recommendations based on patient's age, sex and/or medical conditions. Orders for these recommended tests are listed in the plan section. The patient has been provided with a written plan.    Health Maintenance   Topic Date Due   • LIPID PANEL  08/16/2022   • TDAP/TD VACCINES (3 - Td or Tdap) 04/12/2031   • INFLUENZA VACCINE  Completed              Assessment/Plan   CMS Preventative Services Quick Reference  Risk Factors Identified During Encounter  Cardiovascular Disease  The above risks/problems have been discussed with the patient.  Follow up actions/plans if indicated are seen below in the Assessment/Plan Section.  Pertinent information has been shared with the patient in the After Visit Summary.    Diagnoses and all orders for this visit:    1. Medicare annual wellness visit, subsequent (Primary)    2. Arteriosclerotic heart disease    3. Other hyperlipidemia  Comments:  LDL was less than 70 last August with Dr. Scott        Follow Up:   No follow-ups on file.     An After Visit Summary and PPPS were made available to the patient.                 He is doing great at age 86.

## 2022-08-14 ENCOUNTER — DOCUMENTATION (OUTPATIENT)
Dept: INTERNAL MEDICINE | Facility: CLINIC | Age: 87
End: 2022-08-14

## 2023-06-13 ENCOUNTER — OFFICE VISIT (OUTPATIENT)
Dept: INTERNAL MEDICINE | Facility: CLINIC | Age: 88
End: 2023-06-13
Payer: MEDICARE

## 2023-06-13 VITALS
SYSTOLIC BLOOD PRESSURE: 142 MMHG | TEMPERATURE: 97.3 F | WEIGHT: 169.6 LBS | DIASTOLIC BLOOD PRESSURE: 76 MMHG | HEIGHT: 70 IN | BODY MASS INDEX: 24.28 KG/M2

## 2023-06-13 DIAGNOSIS — Z00.00 ENCOUNTER FOR SUBSEQUENT ANNUAL WELLNESS VISIT (AWV) IN MEDICARE PATIENT: Primary | ICD-10-CM

## 2023-06-13 DIAGNOSIS — E78.49 OTHER HYPERLIPIDEMIA: ICD-10-CM

## 2023-06-13 PROCEDURE — 1170F FXNL STATUS ASSESSED: CPT | Performed by: INTERNAL MEDICINE

## 2023-06-13 PROCEDURE — 1159F MED LIST DOCD IN RCRD: CPT | Performed by: INTERNAL MEDICINE

## 2023-06-13 PROCEDURE — 1160F RVW MEDS BY RX/DR IN RCRD: CPT | Performed by: INTERNAL MEDICINE

## 2023-06-13 PROCEDURE — G0439 PPPS, SUBSEQ VISIT: HCPCS | Performed by: INTERNAL MEDICINE

## 2023-06-13 NOTE — PROGRESS NOTES
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  He does the ellipitcal, hot yoga and walking. He exercises 5-6 days per week. He denies chest pain or dyspnea. He checked his BP yesterday and it was 112/81.  The following portions of the patient's history were reviewed and   updated as appropriate: allergies, current medications, past family history, past medical history, past social history, past surgical history, and problem list.    Compared to one year ago, the patient feels his physical   health is the same.    Compared to one year ago, the patient feels his mental   health is the same.    Recent Hospitalizations:  He was not admitted to the hospital during the last year.       Current Medical Providers:  Patient Care Team:  Giovanni Rossi MD as PCP - General (Internal Medicine)    Outpatient Medications Prior to Visit   Medication Sig Dispense Refill    aspirin 81 MG chewable tablet Chew 1 tablet Daily.      atorvastatin (LIPITOR) 40 MG tablet Take 1 tablet by mouth Daily.  4    bimatoprost (LUMIGAN) 0.01 % ophthalmic drops 1 drop Every Night.      Carboxymethylcellulose Sodium (EYE DROPS OP) Apply  to eye(s) as directed by provider.      Multiple Vitamins-Minerals (VITEYES AREDS ADVANCED PO) Take  by mouth 2 (two) times a day.      ramipril (ALTACE) 5 MG capsule Take 1 capsule by mouth Daily.      TURMERIC PO Take  by mouth.       No facility-administered medications prior to visit.       No opioid medication identified on active medication list. I have reviewed chart for other potential  high risk medication/s and harmful drug interactions in the elderly.        Aspirin is on active medication list. Aspirin use is indicated based on review of current medical condition/s. Pros and cons of this therapy have been discussed today. Benefits of this medication outweigh  "potential harm.  Patient has been encouraged to continue taking this medication.  .      Patient Active Problem List   Diagnosis    Other hyperlipidemia    Arteriosclerotic heart disease     Advance Care Planning   Advance Care Planning     Advance Directive is on file.  ACP discussion was held with the patient during this visit. Patient has an advance directive in EMR which is still valid.      Objective    Vitals:    23 1623   BP: 142/76   Temp: 97.3 °F (36.3 °C)   Weight: 76.9 kg (169 lb 9.6 oz)   Height: 177.8 cm (70\")     Estimated body mass index is 24.34 kg/m² as calculated from the following:    Height as of this encounter: 177.8 cm (70\").    Weight as of this encounter: 76.9 kg (169 lb 9.6 oz).    BMI is within normal parameters. No other follow-up for BMI required.      Does the patient have evidence of cognitive impairment? No          HEALTH RISK ASSESSMENT    Smoking Status:  Social History     Tobacco Use   Smoking Status Never   Smokeless Tobacco Never     Alcohol Consumption:  Social History     Substance and Sexual Activity   Alcohol Use Yes     Fall Risk Screen:    STEADI Fall Risk Assessment was completed, and patient is at LOW risk for falls.Assessment completed on:2023    Depression Screenin/13/2023     4:29 PM   PHQ-2/PHQ-9 Depression Screening   Little Interest or Pleasure in Doing Things 0-->not at all   Feeling Down, Depressed or Hopeless 0-->not at all   PHQ-9: Brief Depression Severity Measure Score 0       Health Habits and Functional and Cognitive Screenin/13/2023     4:29 PM   Functional & Cognitive Status   Do you have difficulty preparing food and eating? No   Do you have difficulty bathing yourself, getting dressed or grooming yourself? No   Do you have difficulty using the toilet? No   Do you have difficulty moving around from place to place? No   Do you have trouble with steps or getting out of a bed or a chair? No   Current Diet Well Balanced Diet "   Dental Exam Up to date   Eye Exam Up to date   Exercise (times per week) 5 times per week   Current Exercises Include Walking;Treadmill        Exercise Comment yoga   Do you need help using the phone?  No   Are you deaf or do you have serious difficulty hearing?  No   Do you need help with transportation? No   Do you need help shopping? No   Do you need help preparing meals?  No   Do you need help with housework?  No   Do you need help with laundry? No   Do you need help taking your medications? No   Do you need help managing money? No   Do you ever drive or ride in a car without wearing a seat belt? No   Have you felt unusual stress, anger or loneliness in the last month? No   Who do you live with? Spouse   If you need help, do you have trouble finding someone available to you? No   Have you been bothered in the last four weeks by sexual problems? No   Do you have difficulty concentrating, remembering or making decisions? No       Age-appropriate Screening Schedule:  Refer to the list below for future screening recommendations based on patient's age, sex and/or medical conditions. Orders for these recommended tests are listed in the plan section. The patient has been provided with a written plan.    Health Maintenance   Topic Date Due    COVID-19 Vaccine (5 - Pfizer series) 03/16/2023    LIPID PANEL  07/09/2023    INFLUENZA VACCINE  10/01/2023    ANNUAL WELLNESS VISIT  06/13/2024    TDAP/TD VACCINES (3 - Td or Tdap) 04/12/2031    Pneumococcal Vaccine 65+  Completed                  CMS Preventative Services Quick Reference  Risk Factors Identified During Encounter  None Identified  The above risks/problems have been discussed with the patient.  Pertinent information has been shared with the patient in the After Visit Summary.  An After Visit Summary and PPPS were made available to the patient.    Follow Up:   Next Medicare Wellness visit to be scheduled in 1 year.       Additional E&M Note during same encounter  "follows:  Patient has multiple medical problems which are significant and separately identifiable that require additional work above and beyond the Medicare Wellness Visit.      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  He does the ellipitcal, hot yoga and walking. He exercises 5-6 days per week. He denies chest pain or dyspnea. He checked his BP yesterday and it was 112/81.       Objective   Vital Signs:  /76   Temp 97.3 °F (36.3 °C)   Ht 177.8 cm (70\")   Wt 76.9 kg (169 lb 9.6 oz)   BMI 24.34 kg/m²     Physical Exam  Vitals reviewed.   Constitutional:       Appearance: He is well-developed.   HENT:      Head: Normocephalic and atraumatic.      Right Ear: Tympanic membrane and ear canal normal.      Left Ear: Tympanic membrane and ear canal normal.   Neck:      Vascular: No carotid bruit.   Cardiovascular:      Rate and Rhythm: Normal rate and regular rhythm.      Heart sounds: Normal heart sounds, S1 normal and S2 normal.   Pulmonary:      Effort: Pulmonary effort is normal.      Breath sounds: Normal breath sounds.   Abdominal:      Palpations: There is no hepatomegaly or splenomegaly.      Tenderness: There is no abdominal tenderness.   Skin:     General: Skin is warm.   Neurological:      Mental Status: He is alert.   Psychiatric:         Behavior: Behavior normal.                       Assessment and Plan   Diagnoses and all orders for this visit:    1. Encounter for subsequent annual wellness visit (AWV) in Medicare patient (Primary)    2. Other hyperlipidemia  -     Comprehensive Metabolic Panel  -     Lipid Panel With / Chol / HDL Ratio             Follow Up   Return in about 1 year (around 6/13/2024) for Medicare Wellness.  Patient was given instructions and counseling regarding his condition or for health maintenance advice. Please see specific information pulled into the AVS if " appropriate.       He will get labs this week. His Bp is lower at home. He will check daily and get numbers to Dr. Scott on Monday.

## 2023-06-14 LAB
ALBUMIN SERPL-MCNC: 4 G/DL (ref 3.5–5.2)
ALBUMIN/GLOB SERPL: 1.9 G/DL
ALP SERPL-CCNC: 64 U/L (ref 39–117)
ALT SERPL-CCNC: 17 U/L (ref 1–41)
AST SERPL-CCNC: 16 U/L (ref 1–40)
BILIRUB SERPL-MCNC: 0.5 MG/DL (ref 0–1.2)
BUN SERPL-MCNC: 21 MG/DL (ref 8–23)
BUN/CREAT SERPL: 18.8 (ref 7–25)
CALCIUM SERPL-MCNC: 10 MG/DL (ref 8.6–10.5)
CHLORIDE SERPL-SCNC: 104 MMOL/L (ref 98–107)
CHOLEST SERPL-MCNC: 114 MG/DL (ref 0–200)
CHOLEST/HDLC SERPL: 2.92 {RATIO}
CO2 SERPL-SCNC: 28.9 MMOL/L (ref 22–29)
CREAT SERPL-MCNC: 1.12 MG/DL (ref 0.76–1.27)
EGFRCR SERPLBLD CKD-EPI 2021: 64.4 ML/MIN/1.73
GLOBULIN SER CALC-MCNC: 2.1 GM/DL
GLUCOSE SERPL-MCNC: 103 MG/DL (ref 65–99)
HDLC SERPL-MCNC: 39 MG/DL (ref 40–60)
LDLC SERPL CALC-MCNC: 59 MG/DL (ref 0–100)
POTASSIUM SERPL-SCNC: 5.1 MMOL/L (ref 3.5–5.2)
PROT SERPL-MCNC: 6.1 G/DL (ref 6–8.5)
SODIUM SERPL-SCNC: 140 MMOL/L (ref 136–145)
TRIGL SERPL-MCNC: 78 MG/DL (ref 0–150)
VLDLC SERPL CALC-MCNC: 16 MG/DL (ref 5–40)

## 2024-06-18 ENCOUNTER — OFFICE VISIT (OUTPATIENT)
Dept: INTERNAL MEDICINE | Facility: CLINIC | Age: 89
End: 2024-06-18
Payer: MEDICARE

## 2024-06-18 VITALS
BODY MASS INDEX: 24.45 KG/M2 | SYSTOLIC BLOOD PRESSURE: 160 MMHG | HEIGHT: 70 IN | DIASTOLIC BLOOD PRESSURE: 80 MMHG | WEIGHT: 170.8 LBS

## 2024-06-18 DIAGNOSIS — E78.00 HIGH CHOLESTEROL: Chronic | ICD-10-CM

## 2024-06-18 DIAGNOSIS — I25.10 ARTERIOSCLEROTIC HEART DISEASE: ICD-10-CM

## 2024-06-18 DIAGNOSIS — Z00.00 ENCOUNTER FOR SUBSEQUENT ANNUAL WELLNESS VISIT (AWV) IN MEDICARE PATIENT: Primary | ICD-10-CM

## 2024-06-18 DIAGNOSIS — R03.0 ELEVATED BLOOD PRESSURE READING: ICD-10-CM

## 2024-06-18 PROCEDURE — 1159F MED LIST DOCD IN RCRD: CPT | Performed by: INTERNAL MEDICINE

## 2024-06-18 PROCEDURE — 1160F RVW MEDS BY RX/DR IN RCRD: CPT | Performed by: INTERNAL MEDICINE

## 2024-06-18 PROCEDURE — 1126F AMNT PAIN NOTED NONE PRSNT: CPT | Performed by: INTERNAL MEDICINE

## 2024-06-18 PROCEDURE — G0439 PPPS, SUBSEQ VISIT: HCPCS | Performed by: INTERNAL MEDICINE

## 2024-06-18 PROCEDURE — 1170F FXNL STATUS ASSESSED: CPT | Performed by: INTERNAL MEDICINE

## 2024-06-18 NOTE — PROGRESS NOTES
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    The following portions of the patient's history were reviewed and   updated as appropriate: allergies, current medications, past family history, past medical history, past social history, past surgical history, and problem list.    Compared to one year ago, the patient feels his physical   health is the same.    Compared to one year ago, the patient feels his mental   health is the same.    Recent Hospitalizations:  He was not admitted to the hospital during the last year.       Current Medical Providers:  Patient Care Team:  Giovanni Rossi MD as PCP - General (Internal Medicine)  Mat Scott MD as Consulting Physician (Cardiology)  Mt Whittaker MD as Consulting Physician (Dermatology)  Florencio Nolen MD as Consulting Physician (Otolaryngology)    Outpatient Medications Prior to Visit   Medication Sig Dispense Refill    aspirin 81 MG chewable tablet Chew 1 tablet Daily.      atorvastatin (LIPITOR) 40 MG tablet Take 1 tablet by mouth Daily.  4    bimatoprost (LUMIGAN) 0.01 % ophthalmic drops 1 drop Every Night.      Carboxymethylcellulose Sodium (EYE DROPS OP) Apply  to eye(s) as directed by provider.      Multiple Vitamins-Minerals (VITEYES AREDS ADVANCED PO) Take  by mouth 2 (two) times a day.      ramipril (ALTACE) 5 MG capsule Take 1 capsule by mouth Daily.      TURMERIC PO Take  by mouth.       No facility-administered medications prior to visit.       No opioid medication identified on active medication list. I have reviewed chart for other potential  high risk medication/s and harmful drug interactions in the elderly.        Aspirin is on active medication list. Aspirin use is indicated based on review of current medical condition/s. Pros and cons of this therapy have been discussed today. Benefits of this  "medication outweigh potential harm.  Patient has been encouraged to continue taking this medication.  .      Patient Active Problem List   Diagnosis    High cholesterol    Arteriosclerotic heart disease     Advance Care Planning   Advance Care Planning     Advance Directive is on file.  ACP discussion was held with the patient during this visit. Patient has an advance directive in EMR which is still valid.      Objective    Vitals:    24 1628   BP: 160/80   Weight: 77.5 kg (170 lb 12.8 oz)   Height: 177.8 cm (70\")     Estimated body mass index is 24.51 kg/m² as calculated from the following:    Height as of this encounter: 177.8 cm (70\").    Weight as of this encounter: 77.5 kg (170 lb 12.8 oz).    BMI is within normal parameters. No other follow-up for BMI required.      Does the patient have evidence of cognitive impairment? No          HEALTH RISK ASSESSMENT    Smoking Status:  Social History     Tobacco Use   Smoking Status Never   Smokeless Tobacco Never     Alcohol Consumption:  Social History     Substance and Sexual Activity   Alcohol Use Yes     Fall Risk Screen:    STEADI Fall Risk Assessment was completed, and patient is at LOW risk for falls.Assessment completed on:2024    Depression Screenin/18/2024     4:30 PM   PHQ-2/PHQ-9 Depression Screening   Little Interest or Pleasure in Doing Things 0-->not at all   Feeling Down, Depressed or Hopeless 0-->not at all   PHQ-9: Brief Depression Severity Measure Score 0       Health Habits and Functional and Cognitive Screenin/18/2024     4:30 PM   Functional & Cognitive Status   Do you have difficulty preparing food and eating? No   Do you have difficulty bathing yourself, getting dressed or grooming yourself? No   Do you have difficulty using the toilet? No   Do you have difficulty moving around from place to place? No   Do you have trouble with steps or getting out of a bed or a chair? No   Current Diet Well Balanced Diet   Dental " Exam Up to date   Eye Exam Up to date   Exercise (times per week) 6 times per week   Current Exercises Include Walking        Exercise Comment hot yoga   Do you need help using the phone?  No   Are you deaf or do you have serious difficulty hearing?  No   Do you need help to go to places out of walking distance? No   Do you need help shopping? No   Do you need help preparing meals?  No   Do you need help with housework?  No   Do you need help with laundry? No   Do you need help taking your medications? No   Do you need help managing money? No   Do you ever drive or ride in a car without wearing a seat belt? No   Have you felt unusual stress, anger or loneliness in the last month? No   Who do you live with? Spouse   If you need help, do you have trouble finding someone available to you? No   Have you been bothered in the last four weeks by sexual problems? No   Do you have difficulty concentrating, remembering or making decisions? No       Age-appropriate Screening Schedule:  Refer to the list below for future screening recommendations based on patient's age, sex and/or medical conditions. Orders for these recommended tests are listed in the plan section. The patient has been provided with a written plan.    Health Maintenance   Topic Date Due    RSV Vaccine - Adults (1 - 1-dose 60+ series) Never done    COVID-19 Vaccine (6 - 2023-24 season) 02/02/2024    LIPID PANEL  06/14/2024    INFLUENZA VACCINE  08/01/2024    ANNUAL WELLNESS VISIT  06/18/2025    TDAP/TD VACCINES (3 - Td or Tdap) 04/12/2031    Pneumococcal Vaccine 65+  Completed                  CMS Preventative Services Quick Reference  Risk Factors Identified During Encounter  None Identified  The above risks/problems have been discussed with the patient.  Pertinent information has been shared with the patient in the After Visit Summary.  An After Visit Summary and PPPS were made available to the patient.    Follow Up:   Next Medicare Wellness visit to be  "scheduled in 1 year.       Additional E&M Note during same encounter follows:  Patient has multiple medical problems which are significant and separately identifiable that require additional work above and beyond the Medicare Wellness Visit.      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    Denies chest pain, dyspnea, dizzy, or abdominal pain. His BP was 128/72 at work earlier today. He sees derm twice per year. The highest his SBP has been was 142. The lowest SBP was 98. Sometimes he uses an electronic machine and sometimes a stethoscope.         Objective   Vital Signs:  /80   Ht 177.8 cm (70\")   Wt 77.5 kg (170 lb 12.8 oz)   BMI 24.51 kg/m²     Physical Exam  Vitals reviewed.   Constitutional:       Appearance: He is well-developed.   HENT:      Head: Normocephalic and atraumatic.   Neck:      Vascular: No carotid bruit.   Cardiovascular:      Rate and Rhythm: Normal rate and regular rhythm.      Heart sounds: Normal heart sounds, S1 normal and S2 normal.   Pulmonary:      Effort: Pulmonary effort is normal.      Breath sounds: Normal breath sounds.   Abdominal:      Palpations: Abdomen is soft. There is no hepatomegaly or splenomegaly.   Skin:     General: Skin is warm.   Neurological:      Mental Status: He is alert.   Psychiatric:         Behavior: Behavior normal.                         Assessment and Plan   Diagnoses and all orders for this visit:    1. Encounter for subsequent annual wellness visit (AWV) in Medicare patient (Primary)    2. High cholesterol  -     Comprehensive Metabolic Panel  -     Lipid Panel With / Chol / HDL Ratio    3. Arteriosclerotic heart disease  Comments:  Modify risk factors.    4. Elevated blood pressure reading  Comments:  He will check his BP twice per day with a stethoscope for the next week and take those to Dr. Scott on Monday             Follow Up   Return in " about 1 year (around 6/18/2025) for Medicare Wellness.  Patient was given instructions and counseling regarding his condition or for health maintenance advice. Please see specific information pulled into the AVS if appropriate.       Labs this week.

## 2024-06-21 ENCOUNTER — LAB (OUTPATIENT)
Facility: HOSPITAL | Age: 89
End: 2024-06-21
Payer: MEDICARE

## 2024-06-21 LAB
ALBUMIN SERPL-MCNC: 4.1 G/DL (ref 3.5–5.2)
ALBUMIN/GLOB SERPL: 1.5 G/DL
ALP SERPL-CCNC: 62 U/L (ref 39–117)
ALT SERPL W P-5'-P-CCNC: 18 U/L (ref 1–41)
ANION GAP SERPL CALCULATED.3IONS-SCNC: 9.7 MMOL/L (ref 5–15)
AST SERPL-CCNC: 22 U/L (ref 1–40)
BILIRUB SERPL-MCNC: 1 MG/DL (ref 0–1.2)
BUN SERPL-MCNC: 27 MG/DL (ref 8–23)
BUN/CREAT SERPL: 23.5 (ref 7–25)
CALCIUM SPEC-SCNC: 9.5 MG/DL (ref 8.6–10.5)
CHLORIDE SERPL-SCNC: 105 MMOL/L (ref 98–107)
CHOLEST SERPL-MCNC: 127 MG/DL (ref 0–200)
CO2 SERPL-SCNC: 26.3 MMOL/L (ref 22–29)
CREAT SERPL-MCNC: 1.15 MG/DL (ref 0.76–1.27)
EGFRCR SERPLBLD CKD-EPI 2021: 62 ML/MIN/1.73
GLOBULIN UR ELPH-MCNC: 2.7 GM/DL
GLUCOSE SERPL-MCNC: 96 MG/DL (ref 65–99)
HDLC SERPL QL: 2.35
HDLC SERPL-MCNC: 54 MG/DL (ref 40–60)
LDLC SERPL CALC-MCNC: 57 MG/DL (ref 0–100)
POTASSIUM SERPL-SCNC: 4.7 MMOL/L (ref 3.5–5.2)
PROT SERPL-MCNC: 6.8 G/DL (ref 6–8.5)
SODIUM SERPL-SCNC: 141 MMOL/L (ref 136–145)
TRIGL SERPL-MCNC: 84 MG/DL (ref 0–150)
VLDLC SERPL-MCNC: 16 MG/DL (ref 5–40)

## 2024-06-21 PROCEDURE — 36415 COLL VENOUS BLD VENIPUNCTURE: CPT | Performed by: INTERNAL MEDICINE

## 2024-06-21 PROCEDURE — 80061 LIPID PANEL: CPT | Performed by: INTERNAL MEDICINE

## 2024-06-21 PROCEDURE — 80053 COMPREHEN METABOLIC PANEL: CPT | Performed by: INTERNAL MEDICINE

## 2025-06-23 ENCOUNTER — OFFICE VISIT (OUTPATIENT)
Dept: INTERNAL MEDICINE | Facility: CLINIC | Age: 89
End: 2025-06-23
Payer: MEDICARE

## 2025-06-23 VITALS
BODY MASS INDEX: 24.2 KG/M2 | DIASTOLIC BLOOD PRESSURE: 78 MMHG | WEIGHT: 169 LBS | SYSTOLIC BLOOD PRESSURE: 132 MMHG | HEIGHT: 70 IN

## 2025-06-23 DIAGNOSIS — E78.00 HIGH CHOLESTEROL: Chronic | ICD-10-CM

## 2025-06-23 DIAGNOSIS — Z00.00 ENCOUNTER FOR SUBSEQUENT ANNUAL WELLNESS VISIT (AWV) IN MEDICARE PATIENT: Primary | ICD-10-CM

## 2025-06-23 DIAGNOSIS — I25.10 ARTERIOSCLEROTIC HEART DISEASE: ICD-10-CM

## 2025-06-23 PROCEDURE — 1126F AMNT PAIN NOTED NONE PRSNT: CPT | Performed by: INTERNAL MEDICINE

## 2025-06-23 PROCEDURE — 1159F MED LIST DOCD IN RCRD: CPT | Performed by: INTERNAL MEDICINE

## 2025-06-23 PROCEDURE — 1160F RVW MEDS BY RX/DR IN RCRD: CPT | Performed by: INTERNAL MEDICINE

## 2025-06-23 PROCEDURE — 1170F FXNL STATUS ASSESSED: CPT | Performed by: INTERNAL MEDICINE

## 2025-06-23 PROCEDURE — G0439 PPPS, SUBSEQ VISIT: HCPCS | Performed by: INTERNAL MEDICINE

## 2025-06-23 NOTE — PROGRESS NOTES
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    The following portions of the patient's history were reviewed and   updated as appropriate: allergies, current medications, past family history, past medical history, past social history, past surgical history, and problem list.    Compared to one year ago, the patient's physical   health is the same.  Compared to one year ago, the patient's mental   health is the same.    Recent Hospitalizations:  He was not admitted to the hospital during the last year.     Current Medical Providers:  Patient Care Team:  Giovanni Rossi MD as PCP - General (Internal Medicine)  Mat Scott MD as Consulting Physician (Cardiology)  Mt Whittaker MD as Consulting Physician (Dermatology)  Florencio Nolen MD as Consulting Physician (Otolaryngology)    Outpatient Medications Prior to Visit   Medication Sig Dispense Refill    aspirin 81 MG chewable tablet Chew 1 tablet Daily.      atorvastatin (LIPITOR) 40 MG tablet Take 1 tablet by mouth Daily.  4    bimatoprost (LUMIGAN) 0.01 % ophthalmic drops 1 drop Every Night.      ramipril (ALTACE) 5 MG capsule Take 1 capsule by mouth Daily.      Multiple Vitamins-Minerals (VITEYES AREDS ADVANCED PO) Take  by mouth 2 (two) times a day.      Carboxymethylcellulose Sodium (EYE DROPS OP) Apply  to eye(s) as directed by provider. (Patient not taking: Reported on 6/23/2025)      TURMERIC PO Take  by mouth. (Patient not taking: Reported on 6/23/2025)       No facility-administered medications prior to visit.     No opioid medication identified on active medication list. I have reviewed chart for other potential  high risk medication/s and harmful drug interactions in the elderly.      Aspirin is on active medication list. Aspirin use is indicated based on review of current medical condition/s. Pros and cons of this  "therapy have been discussed today. Benefits of this medication outweigh potential harm.  Patient has been encouraged to continue taking this medication.  .      Patient Active Problem List   Diagnosis    High cholesterol    Arteriosclerotic heart disease     Advance Care Planning Advance Directive is on file.  ACP discussion was held with the patient during this visit. Patient has an advance directive in EMR which is still valid.             Objective   Vitals:    25 1543   BP: 132/78   Weight: 76.7 kg (169 lb)   Height: 177.8 cm (70\")       Estimated body mass index is 24.25 kg/m² as calculated from the following:    Height as of this encounter: 177.8 cm (70\").    Weight as of this encounter: 76.7 kg (169 lb).    BMI is within normal parameters. No other follow-up for BMI required.           Does the patient have evidence of cognitive impairment? No                                                                                                Health  Risk Assessment    Smoking Status:  Social History     Tobacco Use   Smoking Status Never   Smokeless Tobacco Never     Alcohol Consumption:  Social History     Substance and Sexual Activity   Alcohol Use Yes       Fall Risk Screen  STEADI Fall Risk Assessment was completed, and patient is at LOW risk for falls.Assessment completed on:2025    Depression Screening   Little interest or pleasure in doing things? Not at all   Feeling down, depressed, or hopeless? Not at all   PHQ-2 Total Score 0      Health Habits and Functional and Cognitive Screenin/23/2025     3:46 PM   Functional & Cognitive Status   Do you have difficulty preparing food and eating? No   Do you have difficulty bathing yourself, getting dressed or grooming yourself? No   Do you have difficulty using the toilet? No   Do you have difficulty moving around from place to place? No   Do you have trouble with steps or getting out of a bed or a chair? No   Current Diet Well Balanced Diet "   Dental Exam Up to date   Eye Exam Up to date   Exercise (times per week) 6 times per week   Current Exercises Include Walking        Exercise Comment hot yoga   Do you need help using the phone?  No   Are you deaf or do you have serious difficulty hearing?  Yes   Do you need help to go to places out of walking distance? No   Do you need help shopping? No   Do you need help preparing meals?  No   Do you need help with housework?  No   Do you need help with laundry? No   Do you need help taking your medications? No   Do you need help managing money? No   Do you ever drive or ride in a car without wearing a seat belt? No   Have you felt unusual fatigue (could be tiredness), stress, anger or loneliness in the last month? No   Who do you live with? Spouse   If you need help, do you have trouble finding someone available to you? No   Have you been bothered in the last four weeks by sexual problems? No   Do you have difficulty concentrating, remembering or making decisions? No           Age-appropriate Screening Schedule:  Refer to the list below for future screening recommendations based on patient's age, sex and/or medical conditions. Orders for these recommended tests are listed in the plan section. The patient has been provided with a written plan.    Health Maintenance List  Health Maintenance   Topic Date Due    RSV Vaccine - Adults (1 - 1-dose 75+ series) Never done    COVID-19 Vaccine (7 - 2024-25 season) 03/25/2025    LIPID PANEL  06/21/2025    INFLUENZA VACCINE  07/01/2025    ANNUAL WELLNESS VISIT  06/23/2026    TDAP/TD VACCINES (3 - Td or Tdap) 04/12/2031    Pneumococcal Vaccine 50+  Completed                                                                                                                                                CMS Preventative Services Quick Reference  Risk Factors Identified During Encounter  None Identified    The above risks/problems have been discussed with the patient.  Pertinent  "information has been shared with the patient in the After Visit Summary.  An After Visit Summary and PPPS were made available to the patient.    Follow Up:   Next Medicare Wellness visit to be scheduled in 1 year.         Additional E&M Note during same encounter follows:  Patient has additional, significant, and separately identifiable condition(s)/problem(s) that require work above and beyond the Medicare Wellness Visit     Chief Complaint  Medicare Wellness-subsequent    Subjective   HPI AWV and high chol  Danilo is also being seen today for an annual adult preventative physical exam.   Her SBP has been 102-118/60-70. Denies chest pain, dyspnea, or abdominal pain.              Objective   Vital Signs:  /78   Ht 177.8 cm (70\")   Wt 76.7 kg (169 lb)   BMI 24.25 kg/m²   Physical Exam  Vitals reviewed.   Constitutional:       Appearance: He is well-developed.   HENT:      Head: Normocephalic and atraumatic.   Neck:      Vascular: No carotid bruit.   Cardiovascular:      Rate and Rhythm: Normal rate and regular rhythm.      Heart sounds: Normal heart sounds, S1 normal and S2 normal.   Pulmonary:      Effort: Pulmonary effort is normal.      Breath sounds: Normal breath sounds.   Abdominal:      Palpations: Abdomen is soft. There is no hepatomegaly or splenomegaly.   Skin:     General: Skin is warm.   Neurological:      Mental Status: He is alert.   Psychiatric:         Behavior: Behavior normal.              Assessment and Plan         Encounter for subsequent annual wellness visit (AWV) in Medicare patient    High cholesterol     Arteriosclerotic heart disease    Diagnoses and all orders for this visit:    1. Encounter for subsequent annual wellness visit (AWV) in Medicare patient (Primary)    2. High cholesterol  -     Comprehensive Metabolic Panel  -     Lipid Panel With / Chol / HDL Ratio  -     Comprehensive Metabolic Panel; Future  -     Lipid Panel With / Chol / HDL Ratio; Future    3. Arteriosclerotic " heart disease  Comments:  sees Dr. Scott soon               Follow Up   Return in about 1 year (around 6/23/2026) for Medicare Wellness, Lab Before [facility].  Patient was given instructions and counseling regarding his condition or for health maintenance advice. Please see specific information pulled into the AVS if appropriate.    He is doing well. 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

## 2025-06-25 ENCOUNTER — LAB (OUTPATIENT)
Facility: HOSPITAL | Age: 89
End: 2025-06-25
Payer: MEDICARE

## 2025-06-25 DIAGNOSIS — E78.00 HIGH CHOLESTEROL: Chronic | ICD-10-CM

## 2025-06-25 LAB
ALBUMIN SERPL-MCNC: 4 G/DL (ref 3.5–5.2)
ALBUMIN/GLOB SERPL: 1.6 G/DL
ALP SERPL-CCNC: 69 U/L (ref 39–117)
ALT SERPL W P-5'-P-CCNC: 30 U/L (ref 1–41)
ANION GAP SERPL CALCULATED.3IONS-SCNC: 9.2 MMOL/L (ref 5–15)
AST SERPL-CCNC: 28 U/L (ref 1–40)
BILIRUB SERPL-MCNC: 0.9 MG/DL (ref 0–1.2)
BUN SERPL-MCNC: 25 MG/DL (ref 8–23)
BUN/CREAT SERPL: 23.6 (ref 7–25)
CALCIUM SPEC-SCNC: 9.6 MG/DL (ref 8.6–10.5)
CHLORIDE SERPL-SCNC: 101 MMOL/L (ref 98–107)
CHOLEST SERPL-MCNC: 116 MG/DL (ref 0–200)
CO2 SERPL-SCNC: 26.8 MMOL/L (ref 22–29)
CREAT SERPL-MCNC: 1.06 MG/DL (ref 0.76–1.27)
EGFRCR SERPLBLD CKD-EPI 2021: 67.9 ML/MIN/1.73
GLOBULIN UR ELPH-MCNC: 2.5 GM/DL
GLUCOSE SERPL-MCNC: 98 MG/DL (ref 65–99)
HDLC SERPL QL: 2.58
HDLC SERPL-MCNC: 45 MG/DL (ref 40–60)
LDLC SERPL CALC-MCNC: 56 MG/DL (ref 0–100)
POTASSIUM SERPL-SCNC: 4.7 MMOL/L (ref 3.5–5.2)
PROT SERPL-MCNC: 6.5 G/DL (ref 6–8.5)
SODIUM SERPL-SCNC: 137 MMOL/L (ref 136–145)
TRIGL SERPL-MCNC: 72 MG/DL (ref 0–150)
VLDLC SERPL-MCNC: 15 MG/DL (ref 5–40)

## 2025-06-25 PROCEDURE — 80061 LIPID PANEL: CPT | Performed by: INTERNAL MEDICINE

## 2025-06-25 PROCEDURE — 80053 COMPREHEN METABOLIC PANEL: CPT | Performed by: INTERNAL MEDICINE

## 2025-06-25 PROCEDURE — 36415 COLL VENOUS BLD VENIPUNCTURE: CPT | Performed by: INTERNAL MEDICINE
